# Patient Record
Sex: FEMALE | Race: WHITE | HISPANIC OR LATINO | Employment: UNEMPLOYED | URBAN - METROPOLITAN AREA
[De-identification: names, ages, dates, MRNs, and addresses within clinical notes are randomized per-mention and may not be internally consistent; named-entity substitution may affect disease eponyms.]

---

## 2017-08-21 ENCOUNTER — HOSPITAL ENCOUNTER (OUTPATIENT)
Dept: LAB | Facility: MEDICAL CENTER | Age: 52
End: 2017-08-21
Attending: NURSE PRACTITIONER
Payer: COMMERCIAL

## 2017-08-21 LAB
ALBUMIN SERPL BCP-MCNC: 3.4 G/DL (ref 3.2–4.9)
ALBUMIN/GLOB SERPL: 0.7 G/DL
ALP SERPL-CCNC: 95 U/L (ref 30–99)
ALT SERPL-CCNC: 11 U/L (ref 2–50)
ANION GAP SERPL CALC-SCNC: 7 MMOL/L (ref 0–11.9)
ANISOCYTOSIS BLD QL SMEAR: ABNORMAL
AST SERPL-CCNC: 15 U/L (ref 12–45)
BASOPHILS # BLD AUTO: 0.9 % (ref 0–1.8)
BASOPHILS # BLD: 0.05 K/UL (ref 0–0.12)
BILIRUB SERPL-MCNC: 0.3 MG/DL (ref 0.1–1.5)
BUN SERPL-MCNC: 11 MG/DL (ref 8–22)
CALCIUM SERPL-MCNC: 9.2 MG/DL (ref 8.5–10.5)
CHLORIDE SERPL-SCNC: 104 MMOL/L (ref 96–112)
CHOLEST SERPL-MCNC: 138 MG/DL (ref 100–199)
CO2 SERPL-SCNC: 25 MMOL/L (ref 20–33)
COMMENT 1642: NORMAL
CREAT SERPL-MCNC: 0.46 MG/DL (ref 0.5–1.4)
CRP SERPL HS-MCNC: 4.24 MG/DL (ref 0–0.75)
EOSINOPHIL # BLD AUTO: 0.2 K/UL (ref 0–0.51)
EOSINOPHIL NFR BLD: 3.7 % (ref 0–6.9)
ERYTHROCYTE [DISTWIDTH] IN BLOOD BY AUTOMATED COUNT: 40.3 FL (ref 35.9–50)
ERYTHROCYTE [SEDIMENTATION RATE] IN BLOOD BY WESTERGREN METHOD: 82 MM/HOUR (ref 0–30)
GFR SERPL CREATININE-BSD FRML MDRD: >60 ML/MIN/1.73 M 2
GLOBULIN SER CALC-MCNC: 4.7 G/DL (ref 1.9–3.5)
GLUCOSE SERPL-MCNC: 88 MG/DL (ref 65–99)
HCT VFR BLD AUTO: 31.3 % (ref 37–47)
HDLC SERPL-MCNC: 45 MG/DL
HGB BLD-MCNC: 8.9 G/DL (ref 12–16)
HYPOCHROMIA BLD QL SMEAR: ABNORMAL
IMM GRANULOCYTES # BLD AUTO: 0.01 K/UL (ref 0–0.11)
IMM GRANULOCYTES NFR BLD AUTO: 0.2 % (ref 0–0.9)
LDLC SERPL CALC-MCNC: 78 MG/DL
LYMPHOCYTES # BLD AUTO: 1.83 K/UL (ref 1–4.8)
LYMPHOCYTES NFR BLD: 33.5 % (ref 22–41)
MCH RBC QN AUTO: 18.5 PG (ref 27–33)
MCHC RBC AUTO-ENTMCNC: 28.4 G/DL (ref 33.6–35)
MCV RBC AUTO: 65.2 FL (ref 81.4–97.8)
MICROCYTES BLD QL SMEAR: ABNORMAL
MONOCYTES # BLD AUTO: 0.36 K/UL (ref 0–0.85)
MONOCYTES NFR BLD AUTO: 6.6 % (ref 0–13.4)
MORPHOLOGY BLD-IMP: NORMAL
NEUTROPHILS # BLD AUTO: 3.01 K/UL (ref 2–7.15)
NEUTROPHILS NFR BLD: 55.1 % (ref 44–72)
NRBC # BLD AUTO: 0 K/UL
NRBC BLD AUTO-RTO: 0 /100 WBC
OVALOCYTES BLD QL SMEAR: NORMAL
PLATELET # BLD AUTO: 793 K/UL (ref 164–446)
PLATELET BLD QL SMEAR: NORMAL
PMV BLD AUTO: 8.9 FL (ref 9–12.9)
POIKILOCYTOSIS BLD QL SMEAR: NORMAL
POTASSIUM SERPL-SCNC: 4 MMOL/L (ref 3.6–5.5)
PROT SERPL-MCNC: 8.1 G/DL (ref 6–8.2)
RBC # BLD AUTO: 4.8 M/UL (ref 4.2–5.4)
RBC BLD AUTO: PRESENT
RHEUMATOID FACT SER IA-ACNC: 152 IU/ML (ref 0–14)
SODIUM SERPL-SCNC: 136 MMOL/L (ref 135–145)
T4 FREE SERPL-MCNC: 0.92 NG/DL (ref 0.53–1.43)
TRIGL SERPL-MCNC: 76 MG/DL (ref 0–149)
TSH SERPL DL<=0.005 MIU/L-ACNC: 2.4 UIU/ML (ref 0.3–3.7)
WBC # BLD AUTO: 5.5 K/UL (ref 4.8–10.8)

## 2017-08-21 PROCEDURE — 85652 RBC SED RATE AUTOMATED: CPT

## 2017-08-21 PROCEDURE — 85025 COMPLETE CBC W/AUTO DIFF WBC: CPT

## 2017-08-21 PROCEDURE — 84443 ASSAY THYROID STIM HORMONE: CPT

## 2017-08-21 PROCEDURE — 80061 LIPID PANEL: CPT

## 2017-08-21 PROCEDURE — 84439 ASSAY OF FREE THYROXINE: CPT

## 2017-08-21 PROCEDURE — 36415 COLL VENOUS BLD VENIPUNCTURE: CPT

## 2017-08-21 PROCEDURE — 86431 RHEUMATOID FACTOR QUANT: CPT

## 2017-08-21 PROCEDURE — 80053 COMPREHEN METABOLIC PANEL: CPT

## 2017-08-21 PROCEDURE — 86140 C-REACTIVE PROTEIN: CPT

## 2017-08-28 ENCOUNTER — HOSPITAL ENCOUNTER (OUTPATIENT)
Facility: MEDICAL CENTER | Age: 52
End: 2017-08-28
Attending: NURSE PRACTITIONER
Payer: COMMERCIAL

## 2017-08-28 PROCEDURE — 82274 ASSAY TEST FOR BLOOD FECAL: CPT

## 2017-08-30 LAB — HEMOCCULT STL QL IA: NEGATIVE

## 2023-09-18 ENCOUNTER — APPOINTMENT (OUTPATIENT)
Dept: RADIOLOGY | Facility: MEDICAL CENTER | Age: 58
End: 2023-09-18
Attending: CHIROPRACTOR
Payer: COMMERCIAL

## 2023-10-11 ENCOUNTER — APPOINTMENT (OUTPATIENT)
Dept: RADIOLOGY | Facility: MEDICAL CENTER | Age: 58
End: 2023-10-11
Attending: EMERGENCY MEDICINE
Payer: COMMERCIAL

## 2023-10-11 ENCOUNTER — HOSPITAL ENCOUNTER (EMERGENCY)
Facility: MEDICAL CENTER | Age: 58
End: 2023-10-12
Attending: EMERGENCY MEDICINE
Payer: COMMERCIAL

## 2023-10-11 DIAGNOSIS — M54.17 LUMBOSACRAL RADICULOPATHY: ICD-10-CM

## 2023-10-11 DIAGNOSIS — R51.9 ACUTE NONINTRACTABLE HEADACHE, UNSPECIFIED HEADACHE TYPE: ICD-10-CM

## 2023-10-11 DIAGNOSIS — M54.50 LUMBAR BACK PAIN: ICD-10-CM

## 2023-10-11 LAB
ALBUMIN SERPL BCP-MCNC: 3.5 G/DL (ref 3.2–4.9)
ALBUMIN/GLOB SERPL: 0.9 G/DL
ALP SERPL-CCNC: 118 U/L (ref 30–99)
ALT SERPL-CCNC: 36 U/L (ref 2–50)
ANION GAP SERPL CALC-SCNC: 11 MMOL/L (ref 7–16)
AST SERPL-CCNC: 34 U/L (ref 12–45)
BASOPHILS # BLD AUTO: 0.4 % (ref 0–1.8)
BASOPHILS # BLD: 0.03 K/UL (ref 0–0.12)
BILIRUB SERPL-MCNC: <0.2 MG/DL (ref 0.1–1.5)
BUN SERPL-MCNC: 11 MG/DL (ref 8–22)
CALCIUM ALBUM COR SERPL-MCNC: 10.1 MG/DL (ref 8.5–10.5)
CALCIUM SERPL-MCNC: 9.7 MG/DL (ref 8.5–10.5)
CHLORIDE SERPL-SCNC: 102 MMOL/L (ref 96–112)
CO2 SERPL-SCNC: 24 MMOL/L (ref 20–33)
CREAT SERPL-MCNC: 0.44 MG/DL (ref 0.5–1.4)
CRP SERPL HS-MCNC: 6.5 MG/DL (ref 0–0.75)
EOSINOPHIL # BLD AUTO: 0.49 K/UL (ref 0–0.51)
EOSINOPHIL NFR BLD: 6.4 % (ref 0–6.9)
ERYTHROCYTE [DISTWIDTH] IN BLOOD BY AUTOMATED COUNT: 41 FL (ref 35.9–50)
GFR SERPLBLD CREATININE-BSD FMLA CKD-EPI: 112 ML/MIN/1.73 M 2
GLOBULIN SER CALC-MCNC: 3.9 G/DL (ref 1.9–3.5)
GLUCOSE SERPL-MCNC: 146 MG/DL (ref 65–99)
HCT VFR BLD AUTO: 36.3 % (ref 37–47)
HGB BLD-MCNC: 12 G/DL (ref 12–16)
IMM GRANULOCYTES # BLD AUTO: 0.04 K/UL (ref 0–0.11)
IMM GRANULOCYTES NFR BLD AUTO: 0.5 % (ref 0–0.9)
LYMPHOCYTES # BLD AUTO: 2.15 K/UL (ref 1–4.8)
LYMPHOCYTES NFR BLD: 27.9 % (ref 22–41)
MCH RBC QN AUTO: 26.2 PG (ref 27–33)
MCHC RBC AUTO-ENTMCNC: 33.1 G/DL (ref 32.2–35.5)
MCV RBC AUTO: 79.3 FL (ref 81.4–97.8)
MONOCYTES # BLD AUTO: 0.59 K/UL (ref 0–0.85)
MONOCYTES NFR BLD AUTO: 7.7 % (ref 0–13.4)
NEUTROPHILS # BLD AUTO: 4.4 K/UL (ref 1.82–7.42)
NEUTROPHILS NFR BLD: 57.1 % (ref 44–72)
NRBC # BLD AUTO: 0 K/UL
NRBC BLD-RTO: 0 /100 WBC (ref 0–0.2)
PLATELET # BLD AUTO: 541 K/UL (ref 164–446)
PMV BLD AUTO: 9.5 FL (ref 9–12.9)
POTASSIUM SERPL-SCNC: 3.4 MMOL/L (ref 3.6–5.5)
PROT SERPL-MCNC: 7.4 G/DL (ref 6–8.2)
RBC # BLD AUTO: 4.58 M/UL (ref 4.2–5.4)
SODIUM SERPL-SCNC: 137 MMOL/L (ref 135–145)
WBC # BLD AUTO: 7.7 K/UL (ref 4.8–10.8)

## 2023-10-11 PROCEDURE — 81001 URINALYSIS AUTO W/SCOPE: CPT

## 2023-10-11 PROCEDURE — 86140 C-REACTIVE PROTEIN: CPT

## 2023-10-11 PROCEDURE — 85025 COMPLETE CBC W/AUTO DIFF WBC: CPT

## 2023-10-11 PROCEDURE — 72131 CT LUMBAR SPINE W/O DYE: CPT

## 2023-10-11 PROCEDURE — 70450 CT HEAD/BRAIN W/O DYE: CPT

## 2023-10-11 PROCEDURE — 99284 EMERGENCY DEPT VISIT MOD MDM: CPT

## 2023-10-11 PROCEDURE — 80053 COMPREHEN METABOLIC PANEL: CPT

## 2023-10-11 PROCEDURE — 96375 TX/PRO/DX INJ NEW DRUG ADDON: CPT

## 2023-10-11 PROCEDURE — 96374 THER/PROPH/DIAG INJ IV PUSH: CPT

## 2023-10-11 PROCEDURE — 36415 COLL VENOUS BLD VENIPUNCTURE: CPT

## 2023-10-11 PROCEDURE — 700111 HCHG RX REV CODE 636 W/ 250 OVERRIDE (IP): Mod: JZ | Performed by: EMERGENCY MEDICINE

## 2023-10-11 PROCEDURE — 85652 RBC SED RATE AUTOMATED: CPT

## 2023-10-11 RX ORDER — KETOROLAC TROMETHAMINE 30 MG/ML
15 INJECTION, SOLUTION INTRAMUSCULAR; INTRAVENOUS ONCE
Status: COMPLETED | OUTPATIENT
Start: 2023-10-11 | End: 2023-10-11

## 2023-10-11 RX ORDER — ONDANSETRON 2 MG/ML
4 INJECTION INTRAMUSCULAR; INTRAVENOUS
Status: COMPLETED | OUTPATIENT
Start: 2023-10-11 | End: 2023-10-11

## 2023-10-11 RX ADMIN — ONDANSETRON 4 MG: 2 INJECTION INTRAMUSCULAR; INTRAVENOUS at 21:51

## 2023-10-11 RX ADMIN — KETOROLAC TROMETHAMINE 15 MG: 30 INJECTION, SOLUTION INTRAMUSCULAR; INTRAVENOUS at 21:52

## 2023-10-11 RX ADMIN — FENTANYL CITRATE 50 MCG: 50 INJECTION, SOLUTION INTRAMUSCULAR; INTRAVENOUS at 21:54

## 2023-10-12 VITALS
HEART RATE: 80 BPM | BODY MASS INDEX: 23.56 KG/M2 | DIASTOLIC BLOOD PRESSURE: 77 MMHG | HEIGHT: 60 IN | OXYGEN SATURATION: 100 % | SYSTOLIC BLOOD PRESSURE: 134 MMHG | WEIGHT: 120 LBS | RESPIRATION RATE: 18 BRPM | TEMPERATURE: 97.6 F

## 2023-10-12 LAB
APPEARANCE UR: CLEAR
BACTERIA #/AREA URNS HPF: NEGATIVE /HPF
BILIRUB UR QL STRIP.AUTO: NEGATIVE
COLOR UR: YELLOW
EPI CELLS #/AREA URNS HPF: NEGATIVE /HPF
ERYTHROCYTE [SEDIMENTATION RATE] IN BLOOD BY WESTERGREN METHOD: 60 MM/HOUR (ref 0–25)
GLUCOSE UR STRIP.AUTO-MCNC: NEGATIVE MG/DL
HYALINE CASTS #/AREA URNS LPF: ABNORMAL /LPF
KETONES UR STRIP.AUTO-MCNC: NEGATIVE MG/DL
LEUKOCYTE ESTERASE UR QL STRIP.AUTO: ABNORMAL
MICRO URNS: ABNORMAL
NITRITE UR QL STRIP.AUTO: NEGATIVE
PH UR STRIP.AUTO: 6 [PH] (ref 5–8)
PROT UR QL STRIP: NEGATIVE MG/DL
RBC # URNS HPF: ABNORMAL /HPF
RBC UR QL AUTO: NEGATIVE
SP GR UR STRIP.AUTO: 1.01
UROBILINOGEN UR STRIP.AUTO-MCNC: 0.2 MG/DL
WBC #/AREA URNS HPF: ABNORMAL /HPF

## 2023-10-12 PROCEDURE — 36415 COLL VENOUS BLD VENIPUNCTURE: CPT

## 2023-10-12 PROCEDURE — 96374 THER/PROPH/DIAG INJ IV PUSH: CPT

## 2023-10-12 PROCEDURE — 96375 TX/PRO/DX INJ NEW DRUG ADDON: CPT

## 2023-10-12 PROCEDURE — 99284 EMERGENCY DEPT VISIT MOD MDM: CPT

## 2023-10-12 RX ORDER — IBUPROFEN 600 MG/1
600 TABLET ORAL EVERY 6 HOURS PRN
Qty: 30 TABLET | Refills: 0 | Status: SHIPPED | OUTPATIENT
Start: 2023-10-12 | End: 2023-10-31 | Stop reason: SDUPTHER

## 2023-10-12 RX ORDER — OXYCODONE HYDROCHLORIDE AND ACETAMINOPHEN 5; 325 MG/1; MG/1
1 TABLET ORAL EVERY 4 HOURS PRN
Qty: 12 TABLET | Refills: 0 | Status: SHIPPED | OUTPATIENT
Start: 2023-10-12 | End: 2023-10-15

## 2023-10-12 NOTE — DISCHARGE INSTRUCTIONS
A referral was put in for a primary care physician in the [facility] but please contact your insurance to ensure that [facility] providers are covered.

## 2023-10-12 NOTE — ED TRIAGE NOTES
Chief Complaint   Patient presents with    Headache     Pt started having headache in back of head three days ago.  +vomiting starting yesterday. +dizziness and tingling in hands. Denies unilateral weakness or vision problems.    Back Pain     Hx of sciatica, worsening lately. Pt can only stand for short period of time. Pt takes flexeril and naproxen prescribed by [facility] 3 weeks ago.     BP (!) 140/94   Pulse (!) 103   Temp 36.3 °C (97.4 °F) (Temporal)   Resp 18   Ht 1.524 m (5')   Wt 54.4 kg (120 lb)   SpO2 95%   BMI 23.44 kg/m²     Pt in wheelchair to triage for above complaint. Pt speaking, unlabored, in full sentences. Pt placed in lobby, educated on triage process, and told to notify staff of any change in status. No acute distress noted.

## 2023-10-12 NOTE — ED NOTES
01/05/18 1400   Over the last 2 weeks, how often have you been bothered by any of the following problems?   Little interest or pleasure in doing things 2   Feeling down, depressed, or hopeless 2   Trouble falling or staying asleep, or sleeping too much 3   Feeling tired or having little energy 3   Poor appetite or overeating 3   Feeling bad about yourself - or that you are a failure or have let yourself or your family down 0   Trouble concentrating on things, such as reading the newspaper or watching television 3   Moving or speaking so slowly that other people could have noticed. Or the opposite - being so fidgety or restless that you have been moving around a lot more than usual 3   Thoughts that you would be better off dead, or of hurting yourself in some way 0   PHQ-9 Total Score 19   If you checked off any problems, how difficult have these problems made it for you to do your work, take care of things at home, or get along with other people? Extremely dIfficult      Pt brought to bathroom via wheelchair. Family in restroom to assist with UA.

## 2023-10-12 NOTE — ED NOTES
Discharge instructions given to pt. Prescriptions sent to pt's pharmacy. Pt educated, verbalizes understanding. All belongings accounted for. Pt wheeled out of ED with family at bedside to go home. PIV removed and dressing applied.

## 2023-10-12 NOTE — ED PROVIDER NOTES
ED Provider Note    CHIEF COMPLAINT  Chief Complaint   Patient presents with    Headache     Pt started having headache in back of head three days ago.  +vomiting starting yesterday. +dizziness and tingling in hands. Denies unilateral weakness or vision problems.    Back Pain     Hx of sciatica, worsening lately. Pt can only stand for short period of time. Pt takes flexeril and naproxen prescribed by [facility] 3 weeks ago.       HPI  Lucia Pedersen is a 58 y.o. female who presents for evaluation of headache and back pain.  These appear to be 2 separate issues as she has had back pain for quite some time and only started having a headache 3 days ago.  Patient's sister who accompanies the patient and interprets as the patient is Sami only speaking, states that her sister has been deteriorating over the past 3 months in terms of her ability to walk.  She has a history of rheumatoid arthritis in addition to the sciatic nerve issues.  She also does not have a PCP apparently.  Patient has been on treatment for the sciatica for several weeks now and the pain is simply worsening.  Patient's daughter notes that she thinks her sister is dragging her left foot and having difficulty walking due to pain every time she tries to move the left lower extremity.  She has no bowel or bladder incontinence and no difficulty starting urine stream.  She has a right lower extremity pain and has had no recent falls or trauma.  EXTERNAL RECORDS REVIEWED  Reviewed outpatient notes from the emergency department at [facility] on 19 September for sciatica which had been happening for 10 days at the time of the presentation.  It consisted of left hip and leg pain which was nontraumatic..  Patient was placed on Lidoderm patches, Naprosyn, Flexeril, and Tylenol.  ROS  Constitutional: No fevers or chills.  Lightheadedness noted.  No room spinning sensation.  Skin: No rashes  HEENT: No sore throat, runny nose, nasal congestion, or  visual changes.  No difficulty speaking or swallowing.  Neck: No neck pain  Chest: No pain or rashes  Pulm: No shortness of breath, cough, wheezing, stridor, or pain with inspiration/expiration  Gastrointestinal: Nausea and vomiting noted.  Diarrhea, constipation, bloating, melena, hematochezia or abdominal pain.  Genitourinary: No dysuria or hematuria  Musculoskeletal: Hip and leg pain.  Neurologic: Occasional tingling in both hands.  None currently. No confusion or disorientation.  Heme: No bleeding or bruising problems.   Immuno: No hx of recurrent infections        LIMITATION TO HISTORY   Luxembourger only speaking  OUTSIDE HISTORIAN(S):  Patient's sister who helps translate        PAST FAM HISTORY  History reviewed. No pertinent family history.    PAST MEDICAL HISTORY   has a past medical history of Arthritis, Osteoporosis, and Sciatica.    SOCIAL HISTORY  Social History     Tobacco Use    Smoking status: Never    Smokeless tobacco: Never   Vaping Use    Vaping Use: Never used   Substance and Sexual Activity    Alcohol use: Never    Drug use: Never    Sexual activity: Not on file       SURGICAL HISTORY  patient denies any surgical history    CURRENT MEDICATIONS  Home Medications    **Home medications have not yet been reviewed for this encounter**          ALLERGIES  Allergies   Allergen Reactions    Hydrocodone        PHYSICAL EXAM  VITAL SIGNS: /77   Pulse 80   Temp 36.4 °C (97.6 °F) (Temporal)   Resp 18   Ht 1.524 m (5')   Wt 54.4 kg (120 lb)   SpO2 100%   BMI 23.44 kg/m²    Gen: Alert in no apparent distress.  HEENT: No signs of trauma, Bilateral external ears normal, Nose normal. Conjunctiva normal, Non-icteric.  Pulls equal  Neck:  No tenderness, Supple, No masses.  Patient able to range neck in flexion, extension, and rotation without apparent distress.  Lymphatic: No cervical lymphadenopathy noted.   Cardiovascular: Regular rate and rhythm.  Capillary refill less than 3 seconds to all  extremities, 2+ distal pulses.  Thorax & Lungs: Normal breath sounds, No respiratory distress, No wheezing bilateral chest rise  Abdomen: Bowel sounds normal, Soft, No tenderness, No masses, No pulsatile masses. No Guarding or rebound  Skin: Warm, Dry, No erythema, No rash noted to exposed areas.   Back: Left superior gluteal tenderness but no masses or swelling noted.  Extremities: Intact distal pulses, No edema.  Ulnar deviation of metacarpophalangeal joints bilaterally.  Neurologic: Alert , no facial droop, grossly normal coordination and strength to upper and lower extremities.  Patient able to independently lift both legs off the gurney but takes some encouragement to move the left lower extremity especially at the hip due to pain in the low back..  When sitting on the side of the bed straight leg raise is 5 out of 5 strength as is ankle dorsiflexion and plantarflexion.  Sensation appears to be intact bilaterally and equal.  Psychiatric: Affect pleasant    INITIAL IMPRESSION  Patient arrives for evaluation of what appears to be headache likely related to lack of sleep due to her low back pain.  She has been treated for at least a week and a half with Flexeril and naproxen but she feels it is not working.  For the past few days she has not slept well I suspect this is the source of her headache.  I suspect it is tension and not related to subarachnoid hemorrhage, meningitis, or encephalitis.  Likewise she does not have any neck tenderness or spinous process tenderness from the base of occiput to sacrum.  She does have some left posterior iliac crest tenderness to palpation and some SI joint tenderness to palpation bilaterally.  She is not febrile and does not appear toxic.  I very much doubt discitis, osteomyelitis, or epidural abscess.  Patient has rheumatoid arthritis which is likely contributing but as she has not had imaging of her back I will CT her low back.  I will also get a CT of her head as she feels  her headache is severe.  Again, I very much doubt subarachnoid hemorrhage or space-occupying lesion and she has no focal neurologic deficits.  Her reluctance to move her left lower extremity is likely due to pain.  There is certainly no evidence for foot drop on physical exam.  I will consider MRI of her CT is markedly abnormal or she experiences deterioration.  In the meantime I will give her a small dose of fentanyl and Toradol to help with her pain and likely inflammation.  Labs will be performed including urinalysis, sed rate, and CRP.  Given her rheumatoid arthritis, I suspect that the sed rate and CRP will be elevated no matter what but I feel it is important to get a baseline.    ED observation? No    LABS  Results for orders placed or performed during the hospital encounter of 10/11/23   CBC WITH DIFFERENTIAL   Result Value Ref Range    WBC 7.7 4.8 - 10.8 K/uL    RBC 4.58 4.20 - 5.40 M/uL    Hemoglobin 12.0 12.0 - 16.0 g/dL    Hematocrit 36.3 (L) 37.0 - 47.0 %    MCV 79.3 (L) 81.4 - 97.8 fL    MCH 26.2 (L) 27.0 - 33.0 pg    MCHC 33.1 32.2 - 35.5 g/dL    RDW 41.0 35.9 - 50.0 fL    Platelet Count 541 (H) 164 - 446 K/uL    MPV 9.5 9.0 - 12.9 fL    Neutrophils-Polys 57.10 44.00 - 72.00 %    Lymphocytes 27.90 22.00 - 41.00 %    Monocytes 7.70 0.00 - 13.40 %    Eosinophils 6.40 0.00 - 6.90 %    Basophils 0.40 0.00 - 1.80 %    Immature Granulocytes 0.50 0.00 - 0.90 %    Nucleated RBC 0.00 0.00 - 0.20 /100 WBC    Neutrophils (Absolute) 4.40 1.82 - 7.42 K/uL    Lymphs (Absolute) 2.15 1.00 - 4.80 K/uL    Monos (Absolute) 0.59 0.00 - 0.85 K/uL    Eos (Absolute) 0.49 0.00 - 0.51 K/uL    Baso (Absolute) 0.03 0.00 - 0.12 K/uL    Immature Granulocytes (abs) 0.04 0.00 - 0.11 K/uL    NRBC (Absolute) 0.00 K/uL   COMP METABOLIC PANEL   Result Value Ref Range    Sodium 137 135 - 145 mmol/L    Potassium 3.4 (L) 3.6 - 5.5 mmol/L    Chloride 102 96 - 112 mmol/L    Co2 24 20 - 33 mmol/L    Anion Gap 11.0 7.0 - 16.0    Glucose 146  (H) 65 - 99 mg/dL    Bun 11 8 - 22 mg/dL    Creatinine 0.44 (L) 0.50 - 1.40 mg/dL    Calcium 9.7 8.5 - 10.5 mg/dL    Correct Calcium 10.1 8.5 - 10.5 mg/dL    AST(SGOT) 34 12 - 45 U/L    ALT(SGPT) 36 2 - 50 U/L    Alkaline Phosphatase 118 (H) 30 - 99 U/L    Total Bilirubin <0.2 0.1 - 1.5 mg/dL    Albumin 3.5 3.2 - 4.9 g/dL    Total Protein 7.4 6.0 - 8.2 g/dL    Globulin 3.9 (H) 1.9 - 3.5 g/dL    A-G Ratio 0.9 g/dL   CRP QUANTITIVE (NON-CARDIAC)   Result Value Ref Range    Stat C-Reactive Protein 6.50 (H) 0.00 - 0.75 mg/dL   Sed Rate   Result Value Ref Range    Sed Rate Westergren 60 (H) 0 - 25 mm/hour   URINALYSIS (UA)    Specimen: Urine   Result Value Ref Range    Color Yellow     Character Clear     Specific Gravity 1.010 <1.035    Ph 6.0 5.0 - 8.0    Glucose Negative Negative mg/dL    Ketones Negative Negative mg/dL    Protein Negative Negative mg/dL    Bilirubin Negative Negative    Urobilinogen, Urine 0.2 Negative    Nitrite Negative Negative    Leukocyte Esterase Small (A) Negative    Occult Blood Negative Negative    Micro Urine Req Microscopic    ESTIMATED GFR   Result Value Ref Range    GFR (CKD-EPI) 112 >60 mL/min/1.73 m 2   URINE MICROSCOPIC (W/UA)   Result Value Ref Range    WBC 5-10 (A) /hpf    RBC 0-2 /hpf    Bacteria Negative None /hpf    Epithelial Cells Negative /hpf    Hyaline Cast 0-2 /lpf       RADIOLOGY  CT-LSPINE W/O PLUS RECONS   Final Result      1.  T12 superior endplate Schmorl node. This could be acute or subacute.   2.  No other fracture   3.  Osteopenia   4.  Degenerative changes of the lumbar spine.      CT-HEAD W/O   Final Result      No acute intracranial abnormality.                    COURSE & MEDICAL DECISION MAKING  Pertinent Labs & Imaging studies reviewed. (See chart for details)  Patient's imaging is returning reassuring as are her labs.  It is notable her CRP and sed rate are elevated, however in the setting of rheumatoid arthritis, is unclear what this means. I feel it is  extremely unlikely the patient has discitis, osteomyelitis, or epidural abscess at this point and I do not feel emergent MRI is necessary.  Patient was able to transition from lying in a Semi-Moura's position to sitting and even to standing on both feet.  She noted that her pain had markedly improved and she actually feels tired now and wants to sleep.  She notes no new symptoms and specifically no paresthesias or muscle weakness to the lower extremities.  She notes she has not had any bowel or bladder dysfunction and no difficulty starting urine stream.  I did go over the imaging with the patient and her sister at bedside.  She states understanding that she has disc disease and Schmorl's node.  Neither of these alone warrant further emergent work-up or inpatient evaluation at this point.  The patient's sister states clear understanding the need to establish with a primary care physician for referral to a spine specialist.  She also states very clear understanding of return instructions.  She will be given a prescription for Motrin as well as a short prescription for Percocet as she has had reactions to hydrocodone in the past.      I have discussed management of the patient with the following physicians and CASSIE's:  none    Escalation of care considered, and ultimately not performed:MRI    Barriers to care at this time, including but not limited to: Patient does not have established PCP.     Decision tools and Rx drugs considered including, but not limited to :Pain Medications Norco, motrin      Discussion of management with other QHP or appropriate source(s): none    The patient will not drink alcohol nor drive with prescribed medications. The patient will return for worsening symptoms and is stable at the time of discharge. The patient verbalizes understanding and will comply.    FINAL IMPRESSION  1. Lumbar back pain    2. Lumbosacral radiculopathy    3. Acute nonintractable headache, unspecified headache type         Electronically signed by: Odin Wing M.D., 10/11/2023 9:21 PM

## 2023-10-16 ENCOUNTER — OFFICE VISIT (OUTPATIENT)
Dept: MEDICAL GROUP | Facility: IMAGING CENTER | Age: 58
End: 2023-10-16
Payer: COMMERCIAL

## 2023-10-16 VITALS
DIASTOLIC BLOOD PRESSURE: 70 MMHG | WEIGHT: 120 LBS | BODY MASS INDEX: 23.56 KG/M2 | RESPIRATION RATE: 16 BRPM | TEMPERATURE: 99 F | HEIGHT: 60 IN | OXYGEN SATURATION: 97 % | SYSTOLIC BLOOD PRESSURE: 110 MMHG | HEART RATE: 97 BPM

## 2023-10-16 DIAGNOSIS — R73.9 HYPERGLYCEMIA: ICD-10-CM

## 2023-10-16 DIAGNOSIS — M16.12 OSTEOARTHRITIS OF LEFT HIP, UNSPECIFIED OSTEOARTHRITIS TYPE: ICD-10-CM

## 2023-10-16 DIAGNOSIS — M79.641 PAIN IN BOTH HANDS: ICD-10-CM

## 2023-10-16 DIAGNOSIS — Z11.4 SCREENING FOR HIV (HUMAN IMMUNODEFICIENCY VIRUS): ICD-10-CM

## 2023-10-16 DIAGNOSIS — M47.816 OSTEOARTHRITIS OF LUMBAR SPINE, UNSPECIFIED SPINAL OSTEOARTHRITIS COMPLICATION STATUS: ICD-10-CM

## 2023-10-16 DIAGNOSIS — Z11.59 NEED FOR HEPATITIS C SCREENING TEST: ICD-10-CM

## 2023-10-16 DIAGNOSIS — M79.642 PAIN IN BOTH HANDS: ICD-10-CM

## 2023-10-16 PROCEDURE — 3074F SYST BP LT 130 MM HG: CPT | Performed by: STUDENT IN AN ORGANIZED HEALTH CARE EDUCATION/TRAINING PROGRAM

## 2023-10-16 PROCEDURE — 3078F DIAST BP <80 MM HG: CPT | Performed by: STUDENT IN AN ORGANIZED HEALTH CARE EDUCATION/TRAINING PROGRAM

## 2023-10-16 PROCEDURE — 99205 OFFICE O/P NEW HI 60 MIN: CPT | Performed by: STUDENT IN AN ORGANIZED HEALTH CARE EDUCATION/TRAINING PROGRAM

## 2023-10-16 RX ORDER — OXYCODONE HYDROCHLORIDE AND ACETAMINOPHEN 5; 325 MG/1; MG/1
1 TABLET ORAL EVERY 4 HOURS PRN
COMMUNITY
End: 2024-02-08

## 2023-10-16 ASSESSMENT — ANXIETY QUESTIONNAIRES
3. WORRYING TOO MUCH ABOUT DIFFERENT THINGS: NEARLY EVERY DAY
5. BEING SO RESTLESS THAT IT IS HARD TO SIT STILL: NEARLY EVERY DAY
7. FEELING AFRAID AS IF SOMETHING AWFUL MIGHT HAPPEN: NEARLY EVERY DAY
2. NOT BEING ABLE TO STOP OR CONTROL WORRYING: NEARLY EVERY DAY
GAD7 TOTAL SCORE: 21
4. TROUBLE RELAXING: NEARLY EVERY DAY
6. BECOMING EASILY ANNOYED OR IRRITABLE: NEARLY EVERY DAY
1. FEELING NERVOUS, ANXIOUS, OR ON EDGE: NEARLY EVERY DAY

## 2023-10-16 ASSESSMENT — PATIENT HEALTH QUESTIONNAIRE - PHQ9
SUM OF ALL RESPONSES TO PHQ QUESTIONS 1-9: 24
CLINICAL INTERPRETATION OF PHQ2 SCORE: 6
5. POOR APPETITE OR OVEREATING: 3 - NEARLY EVERY DAY

## 2023-10-16 ASSESSMENT — FIBROSIS 4 INDEX: FIB4 SCORE: 0.6

## 2023-10-16 NOTE — PATIENT INSTRUCTIONS
Thank you for choosing [facility]. It was a pleasure meeting you today.     Take care!  Prabha [facility]

## 2023-10-16 NOTE — PROGRESS NOTES
"Subjective:     CC:   Chief Complaint   Patient presents with    [facility] Care    Follow-Up     10/11 f/v for the Lumbar pain     Headache     Pt reported dizziness , pounding side of the head and back of the head       HPI:   annalise Piña, 58 y.o., female,  presents today with family to discuss:     Back pain: Chronic. Started suddenly in February. Took Tylenol and went away. Experienced another episode of back pain 9/19.  She went to [facility] ED 9/19. Xrays remarkable for: Lumbar vertebral body heights are preserved. Advanced degenerative disc change at L1-2 level with disc space narrowing and vertebral body osteophyte formation. Mild degenerative facet changes L5-S1 level.  Patient was given medications and discharged home. She went to [facility] ED on 10/11 for back pain. CT remarkable for:   1.  T12 superior endplate Schmorl node. This could be acute or subacute.  2.  No other fracture  3.  Osteopenia  4.  Degenerative changes of the lumbar spine.    Percocet and Cyclobenzaprine given. Discharged home. States her back pain is constant, rated 7 out of 10, described as \"pressure.\"  She takes ibuprofen and Percocet as needed with good pain relief. Denies saddle anesthesia, bowel/bladder changes, edema, erythema, ecchymosis, numbness, recent injuries, falls, and, trauma.    L hip pain: chronic. Seen at  [facility] ED 9/19. Xrays remarkable for  ADVANCED OSTEOARTHRITIS OF THE LEFT HIP WITH CHRONIC BONE TO BONE ARTICULATION AND LOSS OF JOINT SPACE. Cyclobenzaprine given. Referred to ortho.  Patient did not follow-up with Ortho.  States she has been having a lot of pain in her left hip.  Pain is constant, rated 10 out of 10, and described as \"pressure.\"  She cannot walk due to the pain. She's using wheelchair to ambulate.  Denies edema, erythema, ecchymosis, and paresthesia.    Hand pain: chronic and mild now. Per pt she has history of arthritis. Unsure if she has RA. She does have bony deformities in both " hands.     Health maintenance: She is due for Pap smear, colorectal cancer screening, and mammogram.  Will address at future visits.  Declined immunizations today.      ROS:  See HPI    Medications, allergies, past medical history, family history, surgical history, and social history documented in chart and reviewed by me.       Objective:   Exam:  /70 (BP Location: Left arm, Patient Position: Sitting, BP Cuff Size: Adult)   Pulse 97   Temp 37.2 °C (99 °F) (Temporal)   Resp 16   Ht 1.524 m (5')   Wt 54.4 kg (120 lb)   SpO2 97%   BMI 23.44 kg/m²      Physical Exam  Constitutional:       General: She is in acute distress.      Appearance: Normal appearance.   HENT:      Head: Normocephalic and atraumatic.   Eyes:      General: No scleral icterus.  Neck:      Thyroid: No thyroid mass or thyromegaly.   Cardiovascular:      Rate and Rhythm: Normal rate and regular rhythm.      Pulses: Normal pulses.      Heart sounds: Normal heart sounds. No murmur heard.  Pulmonary:      Effort: Pulmonary effort is normal.      Breath sounds: Normal breath sounds. No wheezing.   Abdominal:      General: There is no distension.      Palpations: Abdomen is soft. There is no mass.      Tenderness: There is no abdominal tenderness.   Musculoskeletal:         General: No swelling.      Right hand: Deformity present. No swelling or bony tenderness. Normal range of motion.      Left hand: Deformity present. No swelling or bony tenderness. Normal range of motion.      Cervical back: Neck supple. No swelling, edema or tenderness.      Thoracic back: No swelling, edema or tenderness.      Lumbar back: Tenderness present. No swelling, edema, deformity or bony tenderness. Decreased range of motion.      Right hip: No tenderness. Decreased range of motion.      Left hip: Tenderness present. Decreased range of motion.      Right lower leg: No edema.      Left lower leg: No edema.      Comments: Bony prominence in both hands.  Normal  range of motion.  Ulnar deviation present bilateral.   Skin:     General: Skin is warm and dry.   Neurological:      General: No focal deficit present.      Mental Status: She is alert and oriented to person, place, and time.   Psychiatric:         Mood and Affect: Mood normal.         Behavior: Behavior normal.         Thought Content: Thought content normal.         Judgment: Judgment normal.              Assessment & Plan:     1. Osteoarthritis of lumbar spine, unspecified spinal osteoarthritis complication status  Acute on chronic.  Patient was recently seen at the ED on 10/11. CT remarkable for OA.  Referred to spine surgery for further work-up.  Will continue with Percocet and Cyclobenzaprine as needed for pain relief.  Will follow-up in a few weeks.  Strict ED precautions given.  - Referral to Spine Surgery    2. Osteoarthritis of left hip, unspecified osteoarthritis type  Acute on chronic.  She had x-rays at [facility] on 9/19 remarkable for advanced OA with chronic bone to bone articulation.  Stat referral to Ortho today.  Declined referral to PT.  Will continue with Percocet and cyclobenzaprine as needed for pain relief.  - Referral to Orthopedics    3. Pain in both hands  Chronic.  Patient has the classic exam findings of RA.  X-rays ordered and rheumatoid arthritis panel ordered.  She did have elevated CRP at the ED on 10/11.  - DX-HAND 3+ RIGHT; Future  - DX-HAND 3+ LEFT; Future  - RHEUMATOID ARTHRITIS PANEL; Future    4. Hyperglycemia  Acute.  Her blood sugar was elevated at the ED on 10/11.  Will do screening labs as listed below.  - HEMOGLOBIN A1C; Future  - TSH WITH REFLEX TO FT4; Future  - Comp Metabolic Panel; Future  - CBC WITH DIFFERENTIAL; Future  - Lipid Profile; Future  - VITAMIN D 25-HYDROXY    5. Need for hepatitis C screening test  - HEP C VIRUS ANTIBODY; Future    6. Screening for HIV (human immunodeficiency virus)  - HIV AG/AB COMBO ASSAY SCREENING; Future         Diagnosis and treatment plan  explained to pt. Counseled pt on new medication(s) and potential side effects. Pt agreed with treatment plan and verbalized understanding. All questions were answered to the best of my ability.     Return in about 2 weeks (around 10/30/2023) for back pain/hip pain/lab results.     A total of 62 minutes was spent today reviewing chart, assessing and examining the patient, ordering tests, and documenting. None of which was spent performing separately billing procedures or ancillary services.     Please note that this dictation was created using voice recognition software. I have made every reasonable attempt to correct obvious errors, but I expect that there are errors of grammar and possibly content that I did not discover before finalizing the note.    Prabha Elliott PA-C  [facility]

## 2023-10-18 ENCOUNTER — TELEPHONE (OUTPATIENT)
Dept: MEDICAL GROUP | Facility: IMAGING CENTER | Age: 58
End: 2023-10-18

## 2023-10-18 ENCOUNTER — HOSPITAL ENCOUNTER (OUTPATIENT)
Dept: RADIOLOGY | Facility: MEDICAL CENTER | Age: 58
End: 2023-10-18
Attending: STUDENT IN AN ORGANIZED HEALTH CARE EDUCATION/TRAINING PROGRAM
Payer: OTHER MISCELLANEOUS

## 2023-10-18 DIAGNOSIS — M79.642 PAIN IN BOTH HANDS: ICD-10-CM

## 2023-10-18 DIAGNOSIS — M79.641 PAIN IN BOTH HANDS: ICD-10-CM

## 2023-10-18 PROBLEM — M19.042 OSTEOARTHRITIS OF BOTH HANDS: Status: ACTIVE | Noted: 2023-10-18

## 2023-10-18 PROBLEM — M19.041 OSTEOARTHRITIS OF BOTH HANDS: Status: ACTIVE | Noted: 2023-10-18

## 2023-10-18 PROCEDURE — 73130 X-RAY EXAM OF HAND: CPT | Mod: RT

## 2023-10-18 PROCEDURE — 73130 X-RAY EXAM OF HAND: CPT | Mod: LT

## 2023-10-18 NOTE — TELEPHONE ENCOUNTER
Please call to inform her hand x-ray results.  She has severe osteoarthritis in both hands.  Osteoarthritis  occurs when the cartilage that supports our bones deteriorates.  This can lead to inflammation and pain.  On her previous visit I ordered some labs to rule out rheumatoid arthritis.  Please remind patient to complete as soon as she can.  The results will determine if she needs to see ortho or rheumatology.    Thank you

## 2023-10-19 ENCOUNTER — HOSPITAL ENCOUNTER (OUTPATIENT)
Dept: LAB | Facility: MEDICAL CENTER | Age: 58
End: 2023-10-19
Attending: STUDENT IN AN ORGANIZED HEALTH CARE EDUCATION/TRAINING PROGRAM
Payer: OTHER MISCELLANEOUS

## 2023-10-19 DIAGNOSIS — M79.641 PAIN IN BOTH HANDS: ICD-10-CM

## 2023-10-19 DIAGNOSIS — M79.642 PAIN IN BOTH HANDS: ICD-10-CM

## 2023-10-19 DIAGNOSIS — R73.9 HYPERGLYCEMIA: ICD-10-CM

## 2023-10-19 DIAGNOSIS — Z11.4 SCREENING FOR HIV (HUMAN IMMUNODEFICIENCY VIRUS): ICD-10-CM

## 2023-10-19 DIAGNOSIS — R71.8 MICROCYTOSIS: ICD-10-CM

## 2023-10-19 DIAGNOSIS — Z11.59 NEED FOR HEPATITIS C SCREENING TEST: ICD-10-CM

## 2023-10-19 PROBLEM — E11.9 TYPE 2 DIABETES MELLITUS WITHOUT COMPLICATION, WITHOUT LONG-TERM CURRENT USE OF INSULIN (HCC): Status: ACTIVE | Noted: 2023-10-19

## 2023-10-19 PROBLEM — D75.839 THROMBOCYTOSIS: Status: ACTIVE | Noted: 2023-10-19

## 2023-10-19 PROBLEM — E78.5 DYSLIPIDEMIA: Status: ACTIVE | Noted: 2023-10-19

## 2023-10-19 LAB
ALBUMIN SERPL BCP-MCNC: 3.5 G/DL (ref 3.2–4.9)
ALBUMIN/GLOB SERPL: 0.9 G/DL
ALP SERPL-CCNC: 100 U/L (ref 30–99)
ALT SERPL-CCNC: 48 U/L (ref 2–50)
ANION GAP SERPL CALC-SCNC: 12 MMOL/L (ref 7–16)
AST SERPL-CCNC: 38 U/L (ref 12–45)
BASOPHILS # BLD AUTO: 0.9 % (ref 0–1.8)
BASOPHILS # BLD: 0.06 K/UL (ref 0–0.12)
BILIRUB SERPL-MCNC: 0.3 MG/DL (ref 0.1–1.5)
BUN SERPL-MCNC: 11 MG/DL (ref 8–22)
CALCIUM ALBUM COR SERPL-MCNC: 10.9 MG/DL (ref 8.5–10.5)
CALCIUM SERPL-MCNC: 10.5 MG/DL (ref 8.5–10.5)
CHLORIDE SERPL-SCNC: 101 MMOL/L (ref 96–112)
CHOLEST SERPL-MCNC: 165 MG/DL (ref 100–199)
CO2 SERPL-SCNC: 25 MMOL/L (ref 20–33)
CREAT SERPL-MCNC: 0.44 MG/DL (ref 0.5–1.4)
EOSINOPHIL # BLD AUTO: 0.48 K/UL (ref 0–0.51)
EOSINOPHIL NFR BLD: 7.1 % (ref 0–6.9)
ERYTHROCYTE [DISTWIDTH] IN BLOOD BY AUTOMATED COUNT: 39.9 FL (ref 35.9–50)
EST. AVERAGE GLUCOSE BLD GHB EST-MCNC: 160 MG/DL
FASTING STATUS PATIENT QL REPORTED: NORMAL
FERRITIN SERPL-MCNC: 131 NG/ML (ref 10–291)
GFR SERPLBLD CREATININE-BSD FMLA CKD-EPI: 112 ML/MIN/1.73 M 2
GLOBULIN SER CALC-MCNC: 4 G/DL (ref 1.9–3.5)
GLUCOSE SERPL-MCNC: 117 MG/DL (ref 65–99)
HBA1C MFR BLD: 7.2 % (ref 4–5.6)
HCT VFR BLD AUTO: 38.7 % (ref 37–47)
HCV AB SER QL: NORMAL
HDLC SERPL-MCNC: 32 MG/DL
HGB BLD-MCNC: 12.5 G/DL (ref 12–16)
HIV 1+2 AB+HIV1 P24 AG SERPL QL IA: NORMAL
IMM GRANULOCYTES # BLD AUTO: 0.03 K/UL (ref 0–0.11)
IMM GRANULOCYTES NFR BLD AUTO: 0.4 % (ref 0–0.9)
IRON SATN MFR SERPL: 9 % (ref 15–55)
IRON SERPL-MCNC: 27 UG/DL (ref 40–170)
LDLC SERPL CALC-MCNC: 109 MG/DL
LYMPHOCYTES # BLD AUTO: 2.03 K/UL (ref 1–4.8)
LYMPHOCYTES NFR BLD: 30 % (ref 22–41)
MCH RBC QN AUTO: 25.7 PG (ref 27–33)
MCHC RBC AUTO-ENTMCNC: 32.3 G/DL (ref 32.2–35.5)
MCV RBC AUTO: 79.6 FL (ref 81.4–97.8)
MONOCYTES # BLD AUTO: 0.54 K/UL (ref 0–0.85)
MONOCYTES NFR BLD AUTO: 8 % (ref 0–13.4)
NEUTROPHILS # BLD AUTO: 3.63 K/UL (ref 1.82–7.42)
NEUTROPHILS NFR BLD: 53.6 % (ref 44–72)
NRBC # BLD AUTO: 0 K/UL
NRBC BLD-RTO: 0 /100 WBC (ref 0–0.2)
PLATELET # BLD AUTO: 582 K/UL (ref 164–446)
PMV BLD AUTO: 9.8 FL (ref 9–12.9)
POTASSIUM SERPL-SCNC: 3.4 MMOL/L (ref 3.6–5.5)
PROT SERPL-MCNC: 7.5 G/DL (ref 6–8.2)
RBC # BLD AUTO: 4.86 M/UL (ref 4.2–5.4)
SODIUM SERPL-SCNC: 138 MMOL/L (ref 135–145)
TIBC SERPL-MCNC: 293 UG/DL (ref 250–450)
TRIGL SERPL-MCNC: 118 MG/DL (ref 0–149)
TSH SERPL DL<=0.005 MIU/L-ACNC: 4.53 UIU/ML (ref 0.38–5.33)
UIBC SERPL-MCNC: 266 UG/DL (ref 110–370)
WBC # BLD AUTO: 6.8 K/UL (ref 4.8–10.8)

## 2023-10-19 PROCEDURE — 86803 HEPATITIS C AB TEST: CPT

## 2023-10-19 PROCEDURE — 87389 HIV-1 AG W/HIV-1&-2 AB AG IA: CPT

## 2023-10-19 PROCEDURE — 83550 IRON BINDING TEST: CPT

## 2023-10-19 PROCEDURE — 85025 COMPLETE CBC W/AUTO DIFF WBC: CPT

## 2023-10-19 PROCEDURE — 80053 COMPREHEN METABOLIC PANEL: CPT

## 2023-10-19 PROCEDURE — 83516 IMMUNOASSAY NONANTIBODY: CPT

## 2023-10-19 PROCEDURE — 82728 ASSAY OF FERRITIN: CPT

## 2023-10-19 PROCEDURE — 86200 CCP ANTIBODY: CPT

## 2023-10-19 PROCEDURE — 36415 COLL VENOUS BLD VENIPUNCTURE: CPT

## 2023-10-19 PROCEDURE — 80061 LIPID PANEL: CPT

## 2023-10-19 PROCEDURE — 84443 ASSAY THYROID STIM HORMONE: CPT

## 2023-10-19 PROCEDURE — 83036 HEMOGLOBIN GLYCOSYLATED A1C: CPT

## 2023-10-19 PROCEDURE — 83540 ASSAY OF IRON: CPT

## 2023-10-19 PROCEDURE — 86431 RHEUMATOID FACTOR QUANT: CPT

## 2023-10-20 ENCOUNTER — TELEPHONE (OUTPATIENT)
Dept: MEDICAL GROUP | Facility: IMAGING CENTER | Age: 58
End: 2023-10-20
Payer: COMMERCIAL

## 2023-10-20 NOTE — TELEPHONE ENCOUNTER
Please call to inform her hand x-ray results.  She has severe osteoarthritis in both hands.  Osteoarthritis  occurs when the cartilage that supports our bones deteriorates.  This can lead to inflammation and pain.

## 2023-10-24 LAB
CARBAMYLATED PROTEIN ANTIBODY, IGG Q6043: 21 UNITS (ref 0–19)
CCP IGA+IGG SERPL IA-ACNC: 236 UNITS (ref 0–19)
RHEUMATOID FACT SER NEPH-ACNC: 378 IU/ML (ref 0–14)

## 2023-10-25 DIAGNOSIS — E11.9 TYPE 2 DIABETES MELLITUS WITHOUT COMPLICATION, WITHOUT LONG-TERM CURRENT USE OF INSULIN (HCC): ICD-10-CM

## 2023-10-25 DIAGNOSIS — M05.742 RHEUMATOID ARTHRITIS INVOLVING BOTH HANDS WITH POSITIVE RHEUMATOID FACTOR (HCC): ICD-10-CM

## 2023-10-25 DIAGNOSIS — M05.741 RHEUMATOID ARTHRITIS INVOLVING BOTH HANDS WITH POSITIVE RHEUMATOID FACTOR (HCC): ICD-10-CM

## 2023-10-25 DIAGNOSIS — E78.5 DYSLIPIDEMIA: ICD-10-CM

## 2023-10-25 PROBLEM — M05.9 RHEUMATOID ARTHRITIS WITH POSITIVE RHEUMATOID FACTOR (HCC): Status: ACTIVE | Noted: 2023-10-25

## 2023-10-25 RX ORDER — ATORVASTATIN CALCIUM 20 MG/1
20 TABLET, FILM COATED ORAL NIGHTLY
Qty: 30 TABLET | Refills: 3 | Status: SHIPPED | OUTPATIENT
Start: 2023-10-25

## 2023-10-25 RX ORDER — METFORMIN HYDROCHLORIDE 500 MG/1
500 TABLET, EXTENDED RELEASE ORAL DAILY
Qty: 30 TABLET | Refills: 3 | Status: SHIPPED | OUTPATIENT
Start: 2023-10-25

## 2023-10-25 NOTE — PROGRESS NOTES
Spoke with pt in regards to her lab results. Pt was referred to rheumatology for tx of RA. Pt is agreeable in starting medications for diabetes and dyslipidemia. RX for Metformin 500mg ER and Atorvastatin 20mg sent to her pharmacy.

## 2023-10-31 ENCOUNTER — HOSPITAL ENCOUNTER (OUTPATIENT)
Facility: MEDICAL CENTER | Age: 58
End: 2023-10-31
Attending: STUDENT IN AN ORGANIZED HEALTH CARE EDUCATION/TRAINING PROGRAM
Payer: OTHER MISCELLANEOUS

## 2023-10-31 ENCOUNTER — OFFICE VISIT (OUTPATIENT)
Dept: MEDICAL GROUP | Facility: IMAGING CENTER | Age: 58
End: 2023-10-31
Payer: OTHER MISCELLANEOUS

## 2023-10-31 VITALS
OXYGEN SATURATION: 94 % | BODY MASS INDEX: 23.56 KG/M2 | RESPIRATION RATE: 17 BRPM | WEIGHT: 120 LBS | DIASTOLIC BLOOD PRESSURE: 70 MMHG | SYSTOLIC BLOOD PRESSURE: 112 MMHG | TEMPERATURE: 98.3 F | HEART RATE: 88 BPM | HEIGHT: 60 IN

## 2023-10-31 DIAGNOSIS — E78.5 DYSLIPIDEMIA: ICD-10-CM

## 2023-10-31 DIAGNOSIS — D75.839 THROMBOCYTOSIS: ICD-10-CM

## 2023-10-31 DIAGNOSIS — Z12.31 ENCOUNTER FOR SCREENING MAMMOGRAM FOR BREAST CANCER: ICD-10-CM

## 2023-10-31 DIAGNOSIS — E61.1 IRON DEFICIENCY: ICD-10-CM

## 2023-10-31 DIAGNOSIS — E11.9 TYPE 2 DIABETES MELLITUS WITHOUT COMPLICATION, WITHOUT LONG-TERM CURRENT USE OF INSULIN (HCC): ICD-10-CM

## 2023-10-31 DIAGNOSIS — M16.12 OSTEOARTHRITIS OF LEFT HIP, UNSPECIFIED OSTEOARTHRITIS TYPE: ICD-10-CM

## 2023-10-31 DIAGNOSIS — M05.741 RHEUMATOID ARTHRITIS INVOLVING BOTH HANDS WITH POSITIVE RHEUMATOID FACTOR (HCC): ICD-10-CM

## 2023-10-31 DIAGNOSIS — M47.816 OSTEOARTHRITIS OF LUMBAR SPINE, UNSPECIFIED SPINAL OSTEOARTHRITIS COMPLICATION STATUS: ICD-10-CM

## 2023-10-31 DIAGNOSIS — B35.1 ONYCHOMYCOSIS: ICD-10-CM

## 2023-10-31 DIAGNOSIS — M05.742 RHEUMATOID ARTHRITIS INVOLVING BOTH HANDS WITH POSITIVE RHEUMATOID FACTOR (HCC): ICD-10-CM

## 2023-10-31 PROBLEM — D50.9 IRON DEFICIENCY ANEMIA: Status: ACTIVE | Noted: 2023-10-31

## 2023-10-31 PROBLEM — R71.8 MICROCYTOSIS: Status: RESOLVED | Noted: 2023-10-19 | Resolved: 2023-10-31

## 2023-10-31 LAB

## 2023-10-31 PROCEDURE — 99214 OFFICE O/P EST MOD 30 MIN: CPT | Performed by: STUDENT IN AN ORGANIZED HEALTH CARE EDUCATION/TRAINING PROGRAM

## 2023-10-31 PROCEDURE — 81002 URINALYSIS NONAUTO W/O SCOPE: CPT | Performed by: STUDENT IN AN ORGANIZED HEALTH CARE EDUCATION/TRAINING PROGRAM

## 2023-10-31 PROCEDURE — 82043 UR ALBUMIN QUANTITATIVE: CPT

## 2023-10-31 PROCEDURE — 82570 ASSAY OF URINE CREATININE: CPT

## 2023-10-31 PROCEDURE — 3078F DIAST BP <80 MM HG: CPT | Performed by: STUDENT IN AN ORGANIZED HEALTH CARE EDUCATION/TRAINING PROGRAM

## 2023-10-31 PROCEDURE — 3074F SYST BP LT 130 MM HG: CPT | Performed by: STUDENT IN AN ORGANIZED HEALTH CARE EDUCATION/TRAINING PROGRAM

## 2023-10-31 RX ORDER — ASCORBIC ACID 500 MG
500 TABLET ORAL DAILY
Qty: 30 TABLET | Refills: 2 | Status: SHIPPED | OUTPATIENT
Start: 2023-10-31

## 2023-10-31 RX ORDER — CICLOPIROX 80 MG/ML
SOLUTION TOPICAL
Qty: 6 ML | Refills: 3 | Status: SHIPPED | OUTPATIENT
Start: 2023-10-31

## 2023-10-31 RX ORDER — IBUPROFEN 600 MG/1
600 TABLET ORAL EVERY 6 HOURS PRN
Qty: 30 TABLET | Refills: 0 | Status: SHIPPED | OUTPATIENT
Start: 2023-10-31 | End: 2024-02-08

## 2023-10-31 RX ORDER — FERROUS SULFATE 325(65) MG
325 TABLET ORAL DAILY
Qty: 30 TABLET | Refills: 3 | Status: SHIPPED | OUTPATIENT
Start: 2023-10-31 | End: 2024-02-08 | Stop reason: SDUPTHER

## 2023-10-31 ASSESSMENT — FIBROSIS 4 INDEX: FIB4 SCORE: 0.54

## 2023-10-31 NOTE — PROGRESS NOTES
Subjective:     CC:   Chief Complaint   Patient presents with    Other     The referrals didn't call them, and need new orders with the new insurance       HPI:   Lucia Adam Slava, annalise, 58 y.o., female,  presents today to discuss lab results:    DM: new diagnosis. A1C. 7.2. Started metformin 500 mg and has been tolerating well.  Denies polyuria, polyphagia, and polydipsia.     Iron deficiency: new diagnosis. Denies bleeding.     Dyslipidemia: new diagnosis.  She started atorvastatin 20 mg and has been tolerating well.  Denies muscle aches.    RA: new diagnosis.  Has chronic pain and deformities in her hands.          ROS:  See HPI    Medications, allergies, past medical history, family history, surgical history, and social history documented in chart and reviewed by me.       Objective:   Exam:  /70 (BP Location: Right arm, Patient Position: Sitting, BP Cuff Size: Adult)   Pulse 88   Temp 36.8 °C (98.3 °F) (Temporal)   Resp 17   Ht 1.524 m (5')   Wt 54.4 kg (120 lb)   SpO2 94%   BMI 23.44 kg/m²      Physical Exam  Constitutional:       General: She is not in acute distress.     Appearance: Normal appearance.   HENT:      Head: Normocephalic and atraumatic.   Eyes:      General: No scleral icterus.  Neck:      Thyroid: No thyroid mass or thyromegaly.   Cardiovascular:      Rate and Rhythm: Normal rate and regular rhythm.      Pulses:           Dorsalis pedis pulses are 2+ on the right side and 2+ on the left side.        Posterior tibial pulses are 2+ on the right side and 2+ on the left side.      Heart sounds: Normal heart sounds. No murmur heard.  Pulmonary:      Effort: Pulmonary effort is normal.      Breath sounds: Normal breath sounds. No wheezing.   Abdominal:      General: There is no distension.      Palpations: Abdomen is soft. There is no mass.      Tenderness: There is no abdominal tenderness.   Musculoskeletal:         General: Deformity present. No swelling.      Cervical  back: Neck supple.      Right foot: Normal range of motion. Deformity present.      Left foot: Normal range of motion. Deformity present.      Comments: Bony prominence in both hands.  Normal range of motion.  Ulnar deviation present bilateral   Feet:      Right foot:      Protective Sensation: 10 sites tested.        Skin integrity: Skin integrity normal. No ulcer, erythema or warmth.      Toenail Condition: Right toenails are abnormally thick and long. Fungal disease present.     Left foot:      Protective Sensation: 10 sites tested.        Skin integrity: Skin integrity normal. No ulcer, erythema or warmth.      Toenail Condition: Left toenails are abnormally thick and long. Fungal disease present.     Comments: Normal monofilament test 10/10  Skin:     General: Skin is warm and dry.   Neurological:      General: No focal deficit present.      Mental Status: She is alert and oriented to person, place, and time.      Gait: Gait normal.   Psychiatric:         Mood and Affect: Mood normal.         Behavior: Behavior normal.         Thought Content: Thought content normal.         Judgment: Judgment normal.        Lab Results   Component Value Date/Time    POCCOLOR yellow 10/31/2023 11:54 AM    POCAPPEAR cloudy 10/31/2023 11:54 AM    POCLEUKEST small 10/31/2023 11:54 AM    POCNITRITE negative 10/31/2023 11:54 AM    POCUROBILIGE 0.2 10/31/2023 11:54 AM    POCPROTEIN negative 10/31/2023 11:54 AM    POCURPH 6.0 10/31/2023 11:54 AM    POCBLOOD negative 10/31/2023 11:54 AM    POCSPGRV 1.025 10/31/2023 11:54 AM    POCKETONES negative 10/31/2023 11:54 AM    POCBILIRUBIN negative 10/31/2023 11:54 AM    POCGLUCUA negative 10/31/2023 11:54 AM            Assessment & Plan:     1. Type 2 diabetes mellitus without complication, without long-term current use of insulin (HCC)  New diagnosis. A1C 7.2. Metformin 500mg initiated. Pt has been tolerating well.  Patient does not want to titrate dose up at this moment.  Will continue with  Metformin 500 mg and will repeat A1c in 3 months. Discussed the importance of making lifestyle modifications in her diet and to exercise most days of the week for at least 30 min. UA unremarkable. Retinal eye exam and microalbumin ordered today.  Referred to ophthalmology.  Will f/u in 3 months.   - Comp Metabolic Panel; Future  - POCT Retinal Eye Exam  - Microalbumin Creat Ratio Urine - Clinic Collect; Future  - Referral to Ophthalmology  - Diabetic Monofilament Lower Extremity Exam  - POCT Urinalysis    2. Iron deficiency  New diagnosis. No anemia. Sent prescription for ferrous sulfate along with vitamin C.  Recommended consuming foods high in iron.  Will repeat labs in 6 weeks.  - CBC WITH DIFFERENTIAL; Future  - IRON/TOTAL IRON BIND; Future  - ferrous sulfate 325 (65 Fe) MG tablet; Take 1 Tablet by mouth every day.  Dispense: 30 Tablet; Refill: 3  - ascorbic acid (VITAMIN C) 500 MG tablet; Take 1 Tablet by mouth every day.  Dispense: 30 Tablet; Refill: 2    3. Onychomycosis  Chronic.  Will trial ciclopirox.  Referred to podiatry.  - ciclopirox (PENLAC) 8 % solution; Apply evenly over entire nail plate nightly to all affected nails.  Dispense: 6 mL; Refill: 3  - Referral to Podiatry    4. Rheumatoid arthritis involving both hands with positive rheumatoid factor (HCC)  Chronic.  Diagnosis confirmed by labs.  Referred to rheumatology.    5. Dyslipidemia  New diagnosis. The 10-year ASCVD risk score (Homer DK, et al., 2019) is: 4.7% She recently started atorvastatin 20 mg and has been tolerating well.  Will repeat lipid panel in 3 months.    6. Thrombocytosis  New diagnosis. Denies history of bleeding or clotting disorders. Will continue to monitor.     7. Osteoarthritis of lumbar spine, unspecified spinal osteoarthritis complication status  Chronic.  Patient was referred to spine specialty. [facility].  Per patient she has healthcare insurance and she spoke with our referral  department.  They will re-fax the referral.   - ibuprofen (MOTRIN) 600 MG Tab; Take 1 Tablet by mouth every 6 hours as needed for Mild Pain or Moderate Pain.  Dispense: 30 Tablet; Refill: 0    8. Osteoarthritis of left hip, unspecified osteoarthritis type  Chronic.  She was referred to Ortho. Referral was sent to [facility].  Per patient she has healthcare insurance and she spoke with our referral department.  They will re-fax the referral.     9. Encounter for screening mammogram for breast cancer  - MA-SCREENING MAMMO BILAT W/TOMOSYNTHESIS W/CAD; Future         Diagnosis and treatment plan explained to pt. Counseled pt on new medication(s) and potential side effects. Pt agreed with treatment plan and verbalized understanding.     Return in about 7 weeks (around 12/19/2023) for anemia, 3 months for diabetes.     Please note that this dictation was created using voice recognition software. I have made every reasonable attempt to correct obvious errors, but I expect that there are errors of grammar and possibly content that I did not discover before finalizing the note.    Prabha Elliott PA-C  [facility]

## 2023-11-01 LAB
CREAT UR-MCNC: 99.58 MG/DL
MICROALBUMIN UR-MCNC: 1.5 MG/DL
MICROALBUMIN/CREAT UR: 15 MG/G (ref 0–30)

## 2024-02-08 ENCOUNTER — OFFICE VISIT (OUTPATIENT)
Dept: MEDICAL GROUP | Facility: IMAGING CENTER | Age: 59
End: 2024-02-08
Payer: OTHER MISCELLANEOUS

## 2024-02-08 VITALS
HEART RATE: 86 BPM | WEIGHT: 120 LBS | TEMPERATURE: 97.4 F | HEIGHT: 58 IN | BODY MASS INDEX: 25.19 KG/M2 | RESPIRATION RATE: 16 BRPM | DIASTOLIC BLOOD PRESSURE: 68 MMHG | OXYGEN SATURATION: 98 % | SYSTOLIC BLOOD PRESSURE: 124 MMHG

## 2024-02-08 DIAGNOSIS — E11.9 TYPE 2 DIABETES MELLITUS WITHOUT COMPLICATION, WITHOUT LONG-TERM CURRENT USE OF INSULIN (HCC): ICD-10-CM

## 2024-02-08 DIAGNOSIS — M05.741 RHEUMATOID ARTHRITIS INVOLVING BOTH HANDS WITH POSITIVE RHEUMATOID FACTOR (HCC): ICD-10-CM

## 2024-02-08 DIAGNOSIS — M05.742 RHEUMATOID ARTHRITIS INVOLVING BOTH HANDS WITH POSITIVE RHEUMATOID FACTOR (HCC): ICD-10-CM

## 2024-02-08 DIAGNOSIS — Z96.642 S/P TOTAL LEFT HIP ARTHROPLASTY: ICD-10-CM

## 2024-02-08 DIAGNOSIS — E61.1 IRON DEFICIENCY: ICD-10-CM

## 2024-02-08 DIAGNOSIS — Z12.31 ENCOUNTER FOR SCREENING MAMMOGRAM FOR BREAST CANCER: ICD-10-CM

## 2024-02-08 DIAGNOSIS — E78.5 DYSLIPIDEMIA: ICD-10-CM

## 2024-02-08 DIAGNOSIS — D75.839 THROMBOCYTOSIS: ICD-10-CM

## 2024-02-08 LAB
HBA1C MFR BLD: 6.4 % (ref ?–5.8)
POCT INT CON NEG: NEGATIVE
POCT INT CON POS: POSITIVE

## 2024-02-08 PROCEDURE — 3074F SYST BP LT 130 MM HG: CPT | Performed by: STUDENT IN AN ORGANIZED HEALTH CARE EDUCATION/TRAINING PROGRAM

## 2024-02-08 PROCEDURE — 99214 OFFICE O/P EST MOD 30 MIN: CPT | Performed by: STUDENT IN AN ORGANIZED HEALTH CARE EDUCATION/TRAINING PROGRAM

## 2024-02-08 PROCEDURE — 83036 HEMOGLOBIN GLYCOSYLATED A1C: CPT | Performed by: STUDENT IN AN ORGANIZED HEALTH CARE EDUCATION/TRAINING PROGRAM

## 2024-02-08 PROCEDURE — 3078F DIAST BP <80 MM HG: CPT | Performed by: STUDENT IN AN ORGANIZED HEALTH CARE EDUCATION/TRAINING PROGRAM

## 2024-02-08 RX ORDER — ASPIRIN 81 MG
81 TABLET,CHEWABLE ORAL DAILY
COMMUNITY

## 2024-02-08 RX ORDER — FERROUS SULFATE 325(65) MG
325 TABLET ORAL DAILY
Qty: 30 TABLET | Refills: 3 | Status: SHIPPED | OUTPATIENT
Start: 2024-02-08

## 2024-02-08 ASSESSMENT — PATIENT HEALTH QUESTIONNAIRE - PHQ9: CLINICAL INTERPRETATION OF PHQ2 SCORE: 0

## 2024-02-08 ASSESSMENT — FIBROSIS 4 INDEX: FIB4 SCORE: 0.55

## 2024-02-08 NOTE — PROGRESS NOTES
"Subjective:     CC:   Chief Complaint   Patient presents with    Follow-Up     Hip Surgery       HPI:   Lucia Pedersen annalise, 59 y.o., female,  presents today to discuss:     States she had L hip arthrospalty on 1/30/24 with Dr. Jeremie Fields.  States the surgery went well without complications.  She is not having pain and she is able to ambulate now, still uses walker to ambulate.  She has follow-up with her surgeon on February 14.    DM: Chronic.  States she is taking metformin 500 mg daily as prescribed.  Has been tolerating well.  Denies symptoms such as polyuria, polydipsia, or polyphagia.    Reports doing well and does not have new concerns today.        ROS:  See HPI    Medications, allergies, past medical history, family history, surgical history, and social history documented in chart and reviewed by me.       Objective:   Exam:  /68 (BP Location: Left arm, Patient Position: Sitting, BP Cuff Size: Adult)   Pulse 86   Temp 36.3 °C (97.4 °F) (Temporal)   Resp 16   Ht 1.473 m (4' 10\")   Wt 54.4 kg (120 lb)   SpO2 98%   BMI 25.08 kg/m²      Physical Exam  Constitutional:       General: She is not in acute distress.     Appearance: Normal appearance.   HENT:      Head: Normocephalic and atraumatic.   Eyes:      General: No scleral icterus.  Neck:      Thyroid: No thyroid mass or thyromegaly.   Cardiovascular:      Rate and Rhythm: Normal rate and regular rhythm.      Heart sounds: Normal heart sounds. No murmur heard.  Pulmonary:      Effort: Pulmonary effort is normal.      Breath sounds: Normal breath sounds. No wheezing.   Abdominal:      General: Bowel sounds are normal. There is no distension.      Palpations: Abdomen is soft. There is no mass.      Tenderness: There is no abdominal tenderness.   Musculoskeletal:         General: No swelling. Normal range of motion.      Cervical back: Neck supple.   Skin:     General: Skin is warm and dry.      Comments: Incision on L hip is " healing well, no edema, erythema, bleeding, or discharge.   Neurological:      General: No focal deficit present.      Mental Status: She is alert and oriented to person, place, and time.      Gait: Gait normal.   Psychiatric:         Mood and Affect: Mood normal.         Behavior: Behavior normal.         Thought Content: Thought content normal.         Judgment: Judgment normal.              Assessment & Plan:       1. Type 2 diabetes mellitus without complication, without long-term current use of insulin (HCC)  Chronic and well-controlled.  A1c 6.4 today.  Will continue with metformin 500 mg daily.  Will repeat CBC and CMP.  Reminded patient to schedule eye exam.  Will follow-up in 6 months or sooner if needed.  - POCT  A1C  - CBC WITH DIFFERENTIAL; Future  - Comp Metabolic Panel; Future     Latest Reference Range & Units 02/08/24 15:21   Glycohemoglobin 5.8 % 6.4 !   !: Data is abnormal    2. Iron deficiency  Chronic.  Will continue with ferrous sulfate along with vitamin C. Will repeat CBC, ferritin, and iron.  Will follow-up after completing labs.  - ferrous sulfate 325 (65 Fe) MG tablet; Take 1 Tablet by mouth every day.  Dispense: 30 Tablet; Refill: 3  - FERRITIN; Future  - IRON/TOTAL IRON BIND; Future    3. Dyslipidemia  Chronic.  Will continue with atorvastatin 20 mg daily. Will repeat lipid panel.  - Lipid Profile; Future    4. Encounter for screening mammogram for breast cancer  - MA-SCREENING MAMMO BILAT W/TOMOSYNTHESIS W/CAD; Future    5. Thrombocytosis  Chronic.  Will repeat CBC    6. S/P total left hip arthroplasty  Patient underwent left hip arthroplasty with Dr. Jeremie Fields. Per pt surgery went well without complications.  She is not having pain and she is able to ambulate now, still uses walker to ambulate.  She has follow-up with her surgeon on February 14. Incision is healing well. No s/s of infection.     7. Rheumatoid arthritis involving both hands with positive rheumatoid factor  (HCC)  Chronic. Pt will establish with [facility]ology for management.        Diagnosis and treatment plan explained to pt. Pt agreed with treatment plan and verbalized understanding.     Return in about 6 months (around 8/8/2024) for Diabetes.     Please note that this dictation was created using voice recognition software. I have made every reasonable attempt to correct obvious errors, but I expect that there are errors of grammar and possibly content that I did not discover before finalizing the note.    Prabha Elliott PA-C  [facility]

## 2024-03-05 ENCOUNTER — APPOINTMENT (OUTPATIENT)
Dept: RHEUMATOLOGY | Facility: MEDICAL CENTER | Age: 59
End: 2024-03-05
Attending: STUDENT IN AN ORGANIZED HEALTH CARE EDUCATION/TRAINING PROGRAM
Payer: OTHER MISCELLANEOUS

## 2024-08-08 ENCOUNTER — APPOINTMENT (OUTPATIENT)
Dept: MEDICAL GROUP | Facility: IMAGING CENTER | Age: 59
End: 2024-08-08
Payer: OTHER MISCELLANEOUS

## 2024-10-17 ENCOUNTER — PHARMACY VISIT (OUTPATIENT)
Dept: PHARMACY | Facility: MEDICAL CENTER | Age: 59
End: 2024-10-17
Payer: COMMERCIAL

## 2024-10-17 PROCEDURE — RXMED WILLOW AMBULATORY MEDICATION CHARGE: Performed by: STUDENT IN AN ORGANIZED HEALTH CARE EDUCATION/TRAINING PROGRAM
